# Patient Record
Sex: FEMALE | Race: OTHER | NOT HISPANIC OR LATINO | URBAN - METROPOLITAN AREA
[De-identification: names, ages, dates, MRNs, and addresses within clinical notes are randomized per-mention and may not be internally consistent; named-entity substitution may affect disease eponyms.]

---

## 2018-12-12 ENCOUNTER — OUTPATIENT (OUTPATIENT)
Dept: OUTPATIENT SERVICES | Facility: HOSPITAL | Age: 63
LOS: 1 days | Discharge: HOME | End: 2018-12-12

## 2023-07-10 ENCOUNTER — APPOINTMENT (OUTPATIENT)
Dept: ORTHOPEDIC SURGERY | Facility: CLINIC | Age: 68
End: 2023-07-10
Payer: MEDICARE

## 2023-07-10 VITALS — WEIGHT: 140 LBS | HEIGHT: 67 IN | BODY MASS INDEX: 21.97 KG/M2

## 2023-07-10 DIAGNOSIS — M54.59 OTHER LOW BACK PAIN: ICD-10-CM

## 2023-07-10 PROBLEM — Z00.00 ENCOUNTER FOR PREVENTIVE HEALTH EXAMINATION: Status: ACTIVE | Noted: 2023-07-10

## 2023-07-10 PROCEDURE — 99203 OFFICE O/P NEW LOW 30 MIN: CPT

## 2023-07-10 RX ORDER — CELECOXIB 200 MG/1
200 CAPSULE ORAL TWICE DAILY
Qty: 60 | Refills: 3 | Status: ACTIVE | COMMUNITY
Start: 2023-07-10 | End: 1900-01-01

## 2023-07-10 NOTE — DISCUSSION/SUMMARY
[Medication Risks Reviewed] : Medication risks reviewed [de-identified] : Patient I discussed at length the underlying etiology of her symptoms.  Patient has clinical exam and radiographs that should indicate no evidence of arthritis of either the right or left hip.  Patient I were able to review the radiographs together and I was able to show her and patient appreciated the fact that she had good cartilage remaining in both hips.\par \par I talked at length with the patient about how the underlying etiology her clinical exam and history are all consistent with a diagnosis of mild mechanical back pain.  She has no radicular symptoms to include a diagnosis of radiculopathy.  Patient I have decided to try a course of Celebrex 2 mg p.o. twice daily for 4-day course then to be taken thereafter as needed.  The reasonable risk benefits of medication were discussed in detail including potential side effects.  We reviewed the patient's current medication profile patient takes no medications there is no need to consider drug drug interactions.  Patient will follow-up in 2 weeks not thoroughly improved.\par \par Today's consultation lasted 35 minutes

## 2023-07-10 NOTE — PHYSICAL EXAM
[de-identified] : Patient is full passive internal/external rotation of both the right and left hip when held at 90 degrees with no restriction mechanical block or pain.  Patient does have some mild paraspinal tenderness to palpation of lower lumbar paraspinal musculature negative straight leg raising test neurovascular intact distally.  Negative clonus.

## 2023-07-10 NOTE — HISTORY OF PRESENT ILLNESS
[de-identified] : First-time visit for this otherwise healthy 67-year-old female with a complaint of low back pain patient states not very intense but is becoming more regular she is fairly active does Pilates on a regular basis.  She is here for first-time evaluation she denies any change in bowel or bladder habits or any obvious weakness or numbness in the right or left lower extremity pain is well localized in the belt type distribution along the lower back.

## 2023-07-10 NOTE — REASON FOR VISIT
[Initial Visit] : an initial visit for [Hip Pain] : hip pain [FreeTextEntry2] : BILATERAL HIP PAIN. RIGHT IS WORSE. SUFFERING FROM WINTER SEASON OF 2022. THE PAIN IS WORST WHEN FIRST GETTING UP IN THE MORNING. WHEN SHE STARTS TO WALK MORE IT GETS BETTER.